# Patient Record
Sex: FEMALE | Race: WHITE | NOT HISPANIC OR LATINO | Employment: UNEMPLOYED | URBAN - METROPOLITAN AREA
[De-identification: names, ages, dates, MRNs, and addresses within clinical notes are randomized per-mention and may not be internally consistent; named-entity substitution may affect disease eponyms.]

---
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
## 2022-12-04 PROBLEM — R45.89 SUICIDAL BEHAVIOR: Status: ACTIVE | Noted: 2022-12-04

## 2022-12-04 PROBLEM — F11.23 OPIOID DEPENDENCE WITH WITHDRAWAL: Status: ACTIVE | Noted: 2022-12-04

## 2023-12-09 ENCOUNTER — HOSPITAL ENCOUNTER (EMERGENCY)
Facility: HOSPITAL | Age: 23
Discharge: HOME | End: 2023-12-09
Attending: EMERGENCY MEDICINE
Payer: MEDICAID

## 2023-12-09 VITALS
OXYGEN SATURATION: 96 % | SYSTOLIC BLOOD PRESSURE: 102 MMHG | HEART RATE: 93 BPM | DIASTOLIC BLOOD PRESSURE: 56 MMHG | WEIGHT: 115 LBS | TEMPERATURE: 98 F | BODY MASS INDEX: 21.03 KG/M2 | RESPIRATION RATE: 16 BRPM

## 2023-12-09 DIAGNOSIS — R07.89 CHEST WALL PAIN: ICD-10-CM

## 2023-12-09 DIAGNOSIS — R05.9 COUGH, UNSPECIFIED TYPE: Primary | ICD-10-CM

## 2023-12-09 LAB
B-HCG UR QL: NEGATIVE
CTP QC/QA: YES
SARS-COV-2 RDRP RESP QL NAA+PROBE: NEGATIVE

## 2023-12-09 PROCEDURE — 93010 EKG 12-LEAD: ICD-10-PCS | Mod: ,,, | Performed by: INTERNAL MEDICINE

## 2023-12-09 PROCEDURE — 93005 ELECTROCARDIOGRAM TRACING: CPT

## 2023-12-09 PROCEDURE — 99284 EMERGENCY DEPT VISIT MOD MDM: CPT | Mod: 25

## 2023-12-09 PROCEDURE — U0002 COVID-19 LAB TEST NON-CDC: HCPCS | Performed by: EMERGENCY MEDICINE

## 2023-12-09 PROCEDURE — 81025 URINE PREGNANCY TEST: CPT | Performed by: EMERGENCY MEDICINE

## 2023-12-09 PROCEDURE — 93010 ELECTROCARDIOGRAM REPORT: CPT | Mod: ,,, | Performed by: INTERNAL MEDICINE

## 2023-12-09 PROCEDURE — 25000003 PHARM REV CODE 250: Performed by: EMERGENCY MEDICINE

## 2023-12-09 RX ORDER — IBUPROFEN 600 MG/1
600 TABLET ORAL EVERY 6 HOURS PRN
Qty: 20 TABLET | Refills: 0 | Status: SHIPPED | OUTPATIENT
Start: 2023-12-09

## 2023-12-09 RX ORDER — ALBUTEROL SULFATE 90 UG/1
2 AEROSOL, METERED RESPIRATORY (INHALATION) EVERY 4 HOURS PRN
Qty: 18 G | Refills: 0 | Status: SHIPPED | OUTPATIENT
Start: 2023-12-09

## 2023-12-09 RX ORDER — KETOROLAC TROMETHAMINE 10 MG/1
10 TABLET, FILM COATED ORAL
Status: COMPLETED | OUTPATIENT
Start: 2023-12-09 | End: 2023-12-09

## 2023-12-09 RX ADMIN — KETOROLAC TROMETHAMINE 10 MG: 10 TABLET, FILM COATED ORAL at 03:12

## 2023-12-09 NOTE — ED TRIAGE NOTES
Radha Taylor, a 23 y.o. female presents to the ED w/ complaint of intermittent midsternal chest pain x1 week. SOB associated with pain    Triage note:  Chief Complaint   Patient presents with    Chest Pain     +SOB and congestion, denies cardiac hx     Review of patient's allergies indicates:  No Known Allergies  Past Medical History:   Diagnosis Date    Opioid dependence with withdrawal 12/4/2022

## 2023-12-09 NOTE — ED PROVIDER NOTES
"Encounter Date: 12/9/2023       History     Chief Complaint   Patient presents with    Chest Pain     +SOB and congestion, denies cardiac hx     HPI  Radha Taylor is a 23-year-old female with a history of opiate dependence/withdrawal/polysubstance abuse presenting with shortness of breath, cough and congestion associated with chest pain.  Her chest pain is located in the chest wall anteriorly above the sternum.  She denies any associated jaw pain, arm pain, paresthesias, paralysis, nausea, vomiting, diarrhea, hemoptysis, hematemesis or any other symptoms.  She reports smoking frequently as well as vaping regularly.  She denies any current fevers but does report nasal congestion, occasional cough, mild sore throat.  Denies any dental pain, severe chest pain, pleuritic pain, leg swelling, abdominal pain, back pain, neck pain or spine pain.  She denies any skin changes or rashes.  No other aggravating or alleviating factors.    Review of patient's allergies indicates:  No Known Allergies  Past Medical History:   Diagnosis Date    Opioid dependence with withdrawal 12/4/2022     No past surgical history on file.  No family history on file.  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 last used CM "couple days ago"     Review of Systems  All other systems reviewed and were negative; see HPI also for additional ROS.    Physical Exam     Initial Vitals [12/09/23 1419]   BP Pulse Resp Temp SpO2   129/77 73 18 97.6 °F (36.4 °C) 95 %      MAP       --         Physical Exam    Nursing note and vitals reviewed.      Gen/Constitutional: Interactive. No acute distress  Head: Normocephalic, Atraumatic  Neck: supple, no masses or LAD, no JVD  Eyes: PERRLA, conjunctiva clear  Ears, Nose and Throat:  Clear rhinorrhea and no stridor.  There is no oropharyngeal edema, no " oropharyngeal exudates, slight gingival irritation, nasal passageways are inflamed  Chest wall:  No crepitus, tender to palpation in the upper anterior wall, worse with direct palpation and movement of the arm  Cardiac:  Regular rate, Reg Rhythm, No murmur  Pulmonary: CTA Bilat, no wheezes, rhonchi, rales.  No increased work of breathing.  GI: Abdomen soft, non-tender, non-distended; no rebound or guarding  : No CVA tenderness.  Musculoskeletal: Extremities warm, well perfused, no erythema, no edema  Skin: No rashes, cyanosis or jaundice.  Neuro: Alert and Oriented x 3; No focal motor or sensory deficits.    Psych: Normal affect      ED Course   Procedures  Labs Reviewed   SARS-COV-2 RNA AMPLIFICATION, QUAL   POCT URINE PREGNANCY          Imaging Results              X-Ray Chest PA And Lateral (Final result)  Result time 12/09/23 17:44:38      Final result by Sreedhar Pérez DO (12/09/23 17:44:38)                   Impression:      No acute abnormality.      Electronically signed by: Sreedhar Pérez  Date:    12/09/2023  Time:    17:44               Narrative:    EXAMINATION:  XR CHEST PA AND LATERAL    CLINICAL HISTORY:  Other chest pain    TECHNIQUE:  PA and lateral views of the chest were performed.    COMPARISON:  02/26/2018.    FINDINGS:  The lungs are well expanded and clear. No focal opacities are seen. The pleural spaces are clear. The cardiac silhouette is unremarkable. The visualized osseous structures are unremarkable.                                    X-Rays:   Independently Interpreted Readings:   Chest X-Ray: Normal heart size.  No infiltrates.  No acute abnormalities. No pneumothorax or free air     Medications   ketorolac tablet 10 mg (10 mg Oral Given 12/9/23 1535)     Medical Decision Making  Radha Taylor is a 23-year-old female with a history of opiate dependence/withdrawal/polysubstance abuse presenting with shortness of breath, cough and congestion associated with chest pain.    Amount  and/or Complexity of Data Reviewed  Independent Historian: friend     Details: Significant other/friend at bedside providing history Zosyn symptoms  Labs: ordered.     Details: UPT negative, COVID negative  Radiology: ordered and independent interpretation performed.    Risk  Prescription drug management.    Afebrile vital signs are stable.  Physical exam findings remarkable for chest wall tenderness in the upper central chest wall/ribcage.  Symptoms are worsened with coughing with occasional shortness of breath.  Patient continues to vape and smoke which may be contributing to her symptoms.  ECG shows normal sinus rhythm with a heart rate of 79 but no criteria for STEMI on my read.  Chest x-ray obtained to rule out pneumothorax, pneumonia or occult rib fracture or acute findings which were negative on my read.  Awaiting radiology confirmation full read.  COVID testing pending at this time.  I signed out the patient to the oncoming ED physician with final disposition pending re-evaluation, final read from Radiology and COVID testing.  UPT is negative at this time.  I suspect her symptoms are related to her cough, costochondritis/musculoskeletal without acute emergent pathology.  However discharge pending re-evaluation I anticipate discharge with supportive care.                                  Clinical Impression:  Final diagnoses:  [R07.89] Chest wall pain  [R05.9] Cough, unspecified type (Primary)          ED Disposition Condition    Discharge Stable          ED Prescriptions       Medication Sig Dispense Start Date End Date Auth. Provider    ibuprofen (ADVIL,MOTRIN) 600 MG tablet Take 1 tablet (600 mg total) by mouth every 6 (six) hours as needed for Pain. 20 tablet 12/9/2023 -- Dwayne Yoder III, MD    albuterol (PROVENTIL/VENTOLIN HFA) 90 mcg/actuation inhaler Inhale 2 puffs into the lungs every 4 (four) hours as needed for Wheezing. 18 g 12/9/2023 -- Dwayne Yoder III, MD          Follow-up Information        Follow up With Specialties Details Why Contact [facility]  Schedule an appointment as soon as possible for a visit in 1 week  [address] [facility] [address]  [phone]            Milton Abarca DO, FAAEM  Emergency Staff Physician   Dept of Emergency Medicine   [facility]  Spectralink: [id number]        Disclaimer: This note has been generated using voice-recognition software. There may be typographical errors that have been missed during proof-reading.       Milton Abarca DO  12/10/23 0925

## 2023-12-10 NOTE — PROVIDER PROGRESS NOTES - EMERGENCY DEPT.
Encounter Date: 12/9/2023    ED Physician Progress Notes        Patient signed out by Dr. Abarca awaiting chest x-ray and COVID swab.  COVID swab is unremarkable.  Her chest x-ray independently interpreted by myself shows no acute process.  Will discharge home with upper respiratory infection and costochondritis instructions as well as ibuprofen.

## 2024-07-12 ENCOUNTER — TELEPHONE (OUTPATIENT)
Dept: OBSTETRICS AND GYNECOLOGY | Facility: CLINIC | Age: 24
End: 2024-07-12
Payer: MEDICAID

## 2024-07-12 NOTE — TELEPHONE ENCOUNTER
----- Message from Eugenio Howard sent at 7/12/2024  1:12 PM CDT -----  Regarding: appt  Type:Patient Returning Call:Pt        Who Called: MA        Who Left Message for Patient:         Does the patient know what this is regarding? Missed call         Best Call Back Number: Telephone Information:  Mobile          [phone]            Additional Information:

## 2024-07-12 NOTE — TELEPHONE ENCOUNTER
----- Message from Tera Rainey sent at 7/12/2024 12:43 PM CDT -----  Name of Who is Calling: NEO CALDWELL [[id number]]      What is the request in detail: Medicaid pt called to be scheduled for an appt however Epic didn't allow the access to do so.Please contact to further discuss and advise.        Can the clinic reply by [facility]: Y      What Number to Call Back if not in [facility]:  [phone]

## 2024-07-17 ENCOUNTER — OFFICE VISIT (OUTPATIENT)
Dept: OBSTETRICS AND GYNECOLOGY | Facility: CLINIC | Age: 24
End: 2024-07-17
Payer: MEDICAID

## 2024-07-17 ENCOUNTER — CLINICAL SUPPORT (OUTPATIENT)
Dept: OBSTETRICS AND GYNECOLOGY | Facility: CLINIC | Age: 24
End: 2024-07-17
Payer: MEDICAID

## 2024-07-17 VITALS
SYSTOLIC BLOOD PRESSURE: 110 MMHG | WEIGHT: 130.5 LBS | DIASTOLIC BLOOD PRESSURE: 62 MMHG | HEIGHT: 62 IN | BODY MASS INDEX: 24.01 KG/M2

## 2024-07-17 DIAGNOSIS — N89.8 VAGINAL DISCHARGE: ICD-10-CM

## 2024-07-17 DIAGNOSIS — Z30.42 DEPO-PROVERA CONTRACEPTIVE STATUS: Primary | ICD-10-CM

## 2024-07-17 DIAGNOSIS — Z30.013 ENCOUNTER FOR INITIAL PRESCRIPTION OF INJECTABLE CONTRACEPTIVE: Primary | ICD-10-CM

## 2024-07-17 DIAGNOSIS — Z12.4 SCREENING FOR CERVICAL CANCER: ICD-10-CM

## 2024-07-17 DIAGNOSIS — Z01.419 WELL WOMAN EXAM WITH ROUTINE GYNECOLOGICAL EXAM: ICD-10-CM

## 2024-07-17 DIAGNOSIS — Z32.02 NEGATIVE PREGNANCY TEST: ICD-10-CM

## 2024-07-17 LAB
B-HCG UR QL: NEGATIVE
CTP QC/QA: YES

## 2024-07-17 PROCEDURE — 99999PBSHW PR PBB SHADOW TECHNICAL ONLY FILED TO HB: Mod: PBBFAC,,,

## 2024-07-17 PROCEDURE — 99999 PR PBB SHADOW E&M-EST. PATIENT-LVL III: CPT | Mod: PBBFAC,,, | Performed by: OBSTETRICS & GYNECOLOGY

## 2024-07-17 PROCEDURE — 81025 URINE PREGNANCY TEST: CPT | Mod: PBBFAC | Performed by: OBSTETRICS & GYNECOLOGY

## 2024-07-17 PROCEDURE — 99999PBSHW POCT URINE PREGNANCY: Mod: PBBFAC,,,

## 2024-07-17 PROCEDURE — 99213 OFFICE O/P EST LOW 20 MIN: CPT | Mod: PBBFAC | Performed by: OBSTETRICS & GYNECOLOGY

## 2024-07-17 PROCEDURE — 88175 CYTOPATH C/V AUTO FLUID REDO: CPT | Performed by: OBSTETRICS & GYNECOLOGY

## 2024-07-17 RX ORDER — BUPRENORPHINE 300 MG/1
300 SOLUTION SUBCUTANEOUS
COMMUNITY
Start: 2024-04-26

## 2024-07-17 RX ORDER — MEDROXYPROGESTERONE ACETATE 150 MG/ML
150 INJECTION, SUSPENSION INTRAMUSCULAR
Status: SHIPPED | OUTPATIENT
Start: 2024-07-17

## 2024-07-17 RX ADMIN — MEDROXYPROGESTERONE ACETATE 150 MG: 150 INJECTION, SUSPENSION INTRAMUSCULAR at 02:07

## 2024-07-17 NOTE — PROGRESS NOTES
MEDICATION DOCUMENTATION/ CLINIC SUPPLIED MEDS  DEPARTMENT: OB/GYN      Here for Depo Provera injection. Patient with no current complaints of pain prior to or post injection. Advised to wait 5 minutes. Return between 10/2-10/16/2024 for next injection.ORDERING PHYSICIAN: SARAH    Order Type: Written order    MEDICATION: Depo Provera 150ml SITE/ROUTE: RB

## 2024-07-17 NOTE — PROGRESS NOTES
"HISTORY OF PRESENT ILLNESS:    Rdaha Taylor is a 24 y.o. female, , Patient's last menstrual period was 07/10/2024 (approximate).,  presents for a routine annual exam . Wants contraceptive management   Last pap:  2 years   Contraception: Depo-Provera injections.    Sexually transmitted infection risk: very low risk of STD exposure.   Menstrual flow: regular every 28-30 days.lmp 1 weeks ago,no sex since   This is the extent of the patient's complaints at this time.     Past Medical History:   Diagnosis Date    Opioid dependence with withdrawal 2022       History reviewed. No pertinent surgical history.    MEDICATIONS AND ALLERGIES:      Current Outpatient Medications:     ibuprofen (ADVIL,MOTRIN) 600 MG tablet, Take 1 tablet (600 mg total) by mouth every 6 (six) hours as needed for Pain., Disp: 20 tablet, Rfl: 0    SUBLOCADE 300 mg/1.5 mL injection, Inject 300 mg into the skin every 30 days., Disp: , Rfl:     albuterol (PROVENTIL/VENTOLIN HFA) 90 mcg/actuation inhaler, Inhale 2 puffs into the lungs every 4 (four) hours as needed for Wheezing. (Patient not taking: Reported on 2024), Disp: 18 g, Rfl: 0    rOPINIRole (REQUIP) 0.25 MG tablet, Take 1 tablet (0.25 mg total) by mouth every evening., Disp: 30 tablet, Rfl: 0    traZODone (DESYREL) 100 MG tablet, Take 1 tablet (100 mg total) by mouth nightly as needed for Insomnia., Disp: , Rfl:     Review of patient's allergies indicates:  No Known Allergies    No family history on file.    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 last used CM "couple days ago"    Sexual activity: Never     Partners: Male     Social Determinants of Health     Financial Resource Strain: Medium Risk (12/3/2022)    Overall " Financial Resource Strain (CARDIA)     Difficulty of Paying Living Expenses: Somewhat hard   Food Insecurity: No Food Insecurity (12/3/2022)    Hunger Vital Sign     Worried About Running Out of Food in the Last Year: Never true     Ran Out of Food in the Last Year: Never true   Transportation Needs: No Transportation Needs (12/3/2022)    PRAPARE - Transportation     Lack of Transportation (Medical): No     Lack of Transportation (Non-Medical): No   Physical Activity: Inactive (12/3/2022)    Exercise Vital Sign     Days of Exercise per Week: 0 days     Minutes of Exercise per Session: 0 min   Stress: Stress Concern Present (12/3/2022)    [employer] [place] [employer] - Occupational Stress Questionnaire     Feeling of Stress : To some extent   Housing Stability: Low Risk  (12/3/2022)    Housing Stability Vital Sign     Unable to Pay for Housing in the Last Year: No     Number of Places Lived in the Last Year: 1     Unstable Housing in the Last Year: No       OB History    Para Term  AB Living   0 0 0 0 0 0   SAB IAB Ectopic Multiple Live Births   0 0 0 0 0          COMPREHENSIVE GYN HISTORY:  PAP History: Denies abnormal Paps.  Infection History: Denies STDs. Denies PID.  Benign History: Denies uterine fibroids. Denies ovarian cysts. Denies endometriosis. Denies other conditions.  Cancer History: Denies cervical cancer. Denies uterine cancer or hyperplasia. Denies ovarian cancer. Denies vulvar cancer or pre-cancer. Denies vaginal cancer or pre-cancer. Denies breast cancer. Denies colon cancer.      ROS:  GENERAL: No weight changes. No swelling. No fatigue. No fever.  BREASTS: No pain. No lumps. No discharge.  ABDOMEN: No pain. No nausea. No vomiting. No diarrhea. No constipation.  REPRODUCTIVE: No abnormal bleeding. No pelvic pain.   VULVA: No pain. No lesions. No itching.  VAGINA: No relaxation. No itching. No odor. No discharge. No lesions.  URINARY: No incontinence. No nocturia. No  "frequency. No dysuria.    /62   Ht 5' 2" (1.575 m)   Wt 59.2 kg (130 lb 8.2 oz)   LMP 07/10/2024 (Approximate)   BMI 23.87 kg/m²     PE:  Physical Exam   General: wn female nad   Breast wnl   Abdomen-soft   Pelvic :mild discharge ,cervix -nml ,v/v wnl/uterus nssp   PROCEDURES/ORDERS:  Pap      Assessment/Plan:    Well woman exam with routine gynecological exam    Screening for cervical cancer  -     Liquid-Based Pap Smear, Screening    Negative pregnancy test  -     POCT urine pregnancy    Vaginal discharge    Encounter for initial prescription of injectable contraceptive    Depo provera 150 mg Q 3 monthly    Orders Placed This Encounter    POCT urine pregnancy    Liquid-Based Pap Smear, Screening        COUNSELING:  The patient was counseled today on:  -A.C.S. Pap and pelvic exam guidelines, recomendations for yearly mammogram, monthly self breast exams and to follow up with her PCP for other health maintenance.    FOLLOW-UP  annually for [employer].     "

## 2024-07-22 LAB
CLINICAL INFO: NORMAL
CYTO CVX: NORMAL
CYTOLOGIST CVX/VAG CYTO: NORMAL
CYTOLOGIST CVX/VAG CYTO: NORMAL
CYTOLOGY CMNT CVX/VAG CYTO-IMP: NORMAL
CYTOLOGY PAP THIN PREP EXPLANATION: NORMAL
DATE OF PREVIOUS PAP: NORMAL
DATE PREVIOUS BX: NORMAL
GEN CATEG CVX/VAG CYTO-IMP: NORMAL
LMP START DATE: NORMAL
MICROORGANISM CVX/VAG CYTO: NORMAL
PATHOLOGIST CVX/VAG CYTO: NORMAL
SERVICE CMNT-IMP: NORMAL
SPECIMEN SOURCE CVX/VAG CYTO: NORMAL
STAT OF ADQ CVX/VAG CYTO-IMP: NORMAL

## 2024-10-07 ENCOUNTER — CLINICAL SUPPORT (OUTPATIENT)
Dept: OBSTETRICS AND GYNECOLOGY | Facility: CLINIC | Age: 24
End: 2024-10-07
Payer: MEDICAID

## 2024-10-07 DIAGNOSIS — Z30.42 DEPO-PROVERA CONTRACEPTIVE STATUS: Primary | ICD-10-CM

## 2024-10-07 NOTE — PROGRESS NOTES
Here for Depo Provera injection, date due 10/2-10/16/24. Last injection given 7/17/2024. Patient with no current complaints of pain prior to or post injection. Advised to wait 5 minutes. Return between 12/23-01/6/2025 for next injection.        Site:LB